# Patient Record
(demographics unavailable — no encounter records)

---

## 2025-04-10 NOTE — HISTORY OF PRESENT ILLNESS
[de-identified] : weight check [FreeTextEntry6] : Here today for weight check.  - Breast feeding q2-3hrs, mom feels milk is in now. 15-75 min per session but no introducing pacifier at end of longer feeds as thinks looking for comfort. Minimal spitting up. -Stooling well, 6x/day seedy yellow. At least 2 UOP so far today, parents state hard to tell due to frequent stools.   - Good sleeping patterns. -  No parental concerns.

## 2025-04-17 NOTE — HISTORY OF PRESENT ILLNESS
[de-identified] : Weight recheck [FreeTextEntry6] : BF ad carlota > 6 wet diapers/day yellow seedy stools comfortable after feeds

## 2025-04-17 NOTE — DISCUSSION/SUMMARY
[FreeTextEntry1] : 12 day F seen for follow up. Good interval weight gain. Feeding, voiding, stooling well. No jaundice. Normal exam. Very long discussion with parents re: [facility] vaccination requirements and our CDC schedule. Plan will be to give one vaccine per visit as scheduled- 2mo Vaxselis #1 3mo PCV 20 #1 4mo Vaxselis #2 5mo PCV 20 #2 6mo Vaxselis #3 7mo PCV 20 #3 12mo MMR #1 13mo PCV 20 #4 15mo Varicella #1 16mo HIB #4 18mo DTaP #4 Rotavirus series will not be given. Hep A series will be done after the required vaccinations are done. Parents aware aware all of these visits are to be schedule on my schedule only. They verbalized agreement with the plan put forth and verbalized understanding of the information provided. Answered all questions. RTO at 1mo for WCC, sooner if ANY concerns arise.

## 2025-04-21 NOTE — PHYSICAL EXAM
[General Appearance - Alert] : alert [General Appearance - In No Acute Distress] : in no acute distress [General Appearance - Well-Appearing] : well appearing [Appearance Of Head] : the head was normocephalic [Facies] : there were no dysmorphic facial features [Examination Of The Oral Cavity] : mucous membranes were moist and pink [Auscultation Breath Sounds / Voice Sounds] : breath sounds clear to auscultation bilaterally [Normal Chest Appearance] : the chest was normal in appearance [Apical Impulse] : quiet precordium with normal apical impulse [Heart Rate And Rhythm] : normal heart rate and rhythm [Heart Sounds] : normal S1 and S2 [Heart Sounds Gallop] : no gallops [Heart Sounds Pericardial Friction Rub] : no pericardial rub [Edema] : no edema [Arterial Pulses] : normal upper and lower extremity pulses with no pulse delay [Heart Sounds Click] : no clicks [Capillary Refill Test] : normal capillary refill [Systolic] : systolic [II] : a grade 2/6 [LMSB] : LMSB  [Holosystolic] : holosystolic [No Diastolic Murmur] : no diastolic murmur was heard [Bowel Sounds] : normal bowel sounds [Abdomen Soft] : soft [Nondistended] : nondistended [Abdomen Tenderness] : non-tender [Nail Clubbing] : no clubbing  or cyanosis of the fingernails [Motor Tone] : normal muscle strength and tone [] : no rash [Skin Lesions] : no lesions [Skin Turgor] : normal turgor

## 2025-04-21 NOTE — CONSULT LETTER
[Today's Date] : [unfilled] [Name] : Name: [unfilled] [] : : ~~ [Today's Date:] : [unfilled] [Dear  ___:] : Dear Dr. [unfilled]: [Consult] : I had the pleasure of evaluating your patient, [unfilled]. My full evaluation follows. [Consult - Single Provider] : Thank you very much for allowing me to participate in the care of this patient. If you have any questions, please do not hesitate to contact me. [Sincerely,] : Sincerely, [FreeTextEntry4] : Mariana Cook NP, RN [FreeTextEntry5] : [facility] [place] [FreeTextEntry6] : [address], [place] [id number], [place] [phone] [FreeTextEntry7] : [phone] [de-identified] : Marnie Llamas MD, FAAP, FACC  Attending Physician, Division of Pediatric Cardiology  [facility]   , Department of Pediatrics  [facility]

## 2025-04-21 NOTE — CARDIOLOGY SUMMARY
[Today's Date] : [unfilled] [FreeTextEntry1] : 15 lead EKG was performed which demonstrated sinus rhythm at a rate of 157 bpm.  All axes and intervals were within normal limits for age. There was no evidence of ventricular hypertrophy and there were no significant ST segment changes.  [FreeTextEntry2] : 1. Situs solitus, D-ventricular looping, normally related great arteries. 2. Patent foramen ovale with left to right shunt, normal variant. 3. Normal Doppler profile in main pulmonary artery, acceleration of right pulmonary artery flow velocity < 2m/s, consistent with physiologic peripheral pulmonary stenosis and acceleration of left pulmonary artery flow velocity < 2m/s, consistent with physiologic pulmonary stenosis. 4. Mid-muscular ventricular septal defect, small, with left to right systolic interventricular shunt. 5. Normal left ventricular size, morphology and systolic function. 6. Normal right ventricular morphology with qualitatively normal size and systolic function. 7. No patent ductus arteriosus. 8. No pericardial effusion.

## 2025-04-21 NOTE — CLINICAL NARRATIVE
[FreeTextEntry2] : Vaccines not up to date.  Discharge weight on 4/7/25: 2776g Todays weight: 2940g  Patient is gaining 11.7g/day since discharge on 4/7/25.

## 2025-05-08 NOTE — PHYSICAL EXAM
[Alert] : alert [Acute Distress] : no acute distress [Normocephalic] : normocephalic [Flat Open Anterior Fillmore] : flat open anterior fontanelle [PERRL] : PERRL [Red Reflex Bilateral] : red reflex bilateral [Normally Placed Ears] : normally placed ears [Auricles Well Formed] : auricles well formed [Clear Tympanic membranes] : clear tympanic membranes [Light reflex present] : light reflex present [Bony landmarks visible] : bony landmarks visible [Discharge] : no discharge [Nares Patent] : nares patent [Palate Intact] : palate intact [Uvula Midline] : uvula midline [Supple, full passive range of motion] : supple, full passive range of motion [Palpable Masses] : no palpable masses [Symmetric Chest Rise] : symmetric chest rise [Clear to Auscultation Bilaterally] : clear to auscultation bilaterally [Regular Rate and Rhythm] : regular rate and rhythm [S1, S2 present] : S1, S2 present [Murmurs] : no murmurs [+2 Femoral Pulses] : +2 femoral pulses [Soft] : soft [Tender] : nontender [Distended] : not distended [Bowel Sounds] : bowel sounds present [Hepatomegaly] : no hepatomegaly [Splenomegaly] : no splenomegaly [Normal external genitailia] : normal external genitalia [Clitoromegaly] : no clitoromegaly [Patent Vagina] : vagina patent [Normally Placed] : normally placed [No Abnormal Lymph Nodes Palpated] : no abnormal lymph nodes palpated [Lee-Ortolani] : negative Lee-Ortolani [Symmetric Flexed Extremities] : symmetric flexed extremities [Spinal Dimple] : no spinal dimple [Tuft of Hair] : no tuft of hair [Startle Reflex] : startle reflex present [Suck Reflex] : suck reflex present [Rooting] : rooting reflex present [Palmar Grasp] : palmar grasp reflex present [Plantar Grasp] : plantar grasp reflex present [Symmetric Roque] : symmetric Eden [Jaundice] : no jaundice [Rash and/or lesion present] : no rash/lesion [FreeTextEntry2] : very small but open anterior fontanelle; head appears grossly normocephalic except for asymmetry in the b/l frontal regions- left appears more rounded, right frontal area appears flatter [FreeTextEntry9] : no masses

## 2025-05-08 NOTE — HISTORY OF PRESENT ILLNESS
[Breast milk] : breast milk [Normal] : Normal [In Bassinet/Crib] : sleeps in bassinet/crib [On back] : sleeps on back [Co-sleeping] : no co-sleeping [Loose bedding, pillow, toys, and/or bumpers in crib] : no loose bedding, pillow, toys, and/or bumpers in crib [No] : No cigarette smoke exposure [Exposure to electronic nicotine delivery system] : No exposure to electronic nicotine delivery system [Rear facing car seat in back seat] : Rear facing car seat in back seat [Carbon Monoxide Detectors] : Carbon monoxide detectors at home [Smoke Detectors] : Smoke detectors at home. [FreeTextEntry7] : parents had sent me a picture of the intermittent round bulge they see in baby's abdomen; baby is feeding, voiding, stooling well. Some spit up, no large volume, no projectile. Parents are also concerned re: head shape- left frontal area of head seems more pronounced; prefers to face rightward; saw cardiology- PFO, PPS, VSD- asymptomatic and thriving

## 2025-05-08 NOTE — DISCUSSION/SUMMARY
[Normal Growth] : growth [Normal Development] : development  [No Elimination Concerns] : elimination [Continue Regimen] : feeding [No Skin Concerns] : skin [Normal Sleep Pattern] : sleep [None] : no medical problems [Anticipatory Guidance Given] : Anticipatory guidance addressed as per the history of present illness section [Parental Well-Being] : parental well-being [Family Adjustment] : family adjustment [Feeding Routines] : feeding routines [Infant Adjustment] : infant adjustment [Safety] : safety [No Medications] : ~He/She~ is not on any medications [Parent/Guardian] : Parent/Guardian [FreeTextEntry1] : 1mo F seen for WCC. Normal exam aside from mild asymmetry of front of skull and small AF. Appropriate interval growth. US of abdomen- limited, to assess the intermittent round protrusion (midline abdominal wall). Neurosurgery referral re: head shape, small AF. Parents will start vaccines at next month with modified vaccine schedule that we agreed upon. All vaccine visits are to be scheduled on my schedule- parents are aware. RTO 1 mo for 2mo WCC.

## 2025-05-23 NOTE — REASON FOR VISIT
[Initial - Scheduled] : an initial, scheduled visit with concerns of [Soft tissue mass] : soft tissue mass [Parents] : parents [Family Member] : family member [Mother] : mother

## 2025-05-28 NOTE — PHYSICAL EXAM
[Acute Distress] : no acute distress [Alert] : alert [Toxic appearing] : well appearing [Normocephalic] : normocephalic [Icteric sclera] : no icteric sclera [FROM] : full range of motion [Normal Respiratory Effort] : normal respiratory efforts [Tender] : not tender [Distended] : not distended [Moves all extremities x4] : moves all extremities x4 [Grossly intact] : grossly intact [TextBox_37] : No mass palpable in the region of concern.  There is no evidence of epigastric hernia and no umbilical hernia

## 2025-05-28 NOTE — CONSULT LETTER
[Dear  ___] : Dear  [unfilled], [Consult Letter:] : I had the pleasure of evaluating your patient, [unfilled]. [Please see my note below.] : Please see my note below. [Consult Closing:] : Thank you very much for allowing me to participate in the care of this patient.  If you have any questions, please do not hesitate to contact me. [Sincerely,] : Sincerely, [FreeTextEntry3] : Echo De La Rosa MD Division of Pediatric, General, Thoracic and Endoscopic Surgery [facility]

## 2025-05-28 NOTE — CONSULT LETTER
[Dear  ___] : Dear  [unfilled], [Consult Letter:] : I had the pleasure of evaluating your patient, [unfilled]. [Please see my note below.] : Please see my note below. [Consult Closing:] : Thank you very much for allowing me to participate in the care of this patient.  If you have any questions, please do not hesitate to contact me. [Sincerely,] : Sincerely, [FreeTextEntry3] : Echo De La Rosa MD Division of Pediatric, General, Thoracic and Endoscopic Surgery [facility]

## 2025-05-28 NOTE — HISTORY OF PRESENT ILLNESS
[FreeTextEntry1] : Eun is a 47day old female presenting today for surgical evaluation of an abdominal mass.  Mother noticed a bump above the umbilicus around 1 week of life.  It seemed to be intermittent.  There was no associated redness or apparent discomfort.  Mother has a picture which is concerning in appearance for an epigastric hernia.  Eun has been eating well and having normal bowel function.  Mother has not noticed the bump for the last several weeks.  An US of the area demonstrated a 2 x 1 x3 mm echogenic structure in the anterior abdominal wall in the supraumbilical region.   PMHx:  Full-term; no additional post-sally hospital course PHSx:  None Meds:  None NKDA Family:  No h/o anesthesia related complications or bleeding d/o

## 2025-05-28 NOTE — ASSESSMENT
[FreeTextEntry1] : Eun presents for evaluation of a possible mass of the abdominal wall.  Mother first noticed the lesion shortly after birth, but has not seen it in the last few weeks.  She has a picture of the lesion which is concerning in appearance for an epigastric hernia, however, no evidence of a hernia is appreciated on exam today.  No mass is able to be palpated.  The US results were reviewed, however are non-specific.  We will plan for continued observation as there is no clear indication for surgical intervention at this time.  Mother was also counseled on the nature of epigastric hernias as well as the signs and symptoms of hernia incarceration.  She understands the need to seek immediate medical attention should there be concern for hernia incarceration.  Mother has otherwise been instructed to return to the office should she notice the lesion again, or for any other concerns.  We will otherwise plan for surgical follow-up on an as needed basis.

## 2025-05-28 NOTE — CONSULT LETTER
[Dear  ___] : Dear  [unfilled], [Consult Letter:] : I had the pleasure of evaluating your patient, [unfilled]. [Please see my note below.] : Please see my note below. [Consult Closing:] : Thank you very much for allowing me to participate in the care of this patient.  If you have any questions, please do not hesitate to contact me. [Sincerely,] : Sincerely, [FreeTextEntry3] : Echo De La Rosa MD Division of Pediatric, General, Thoracic and Endoscopic Surgery [facility]

## 2025-07-05 NOTE — HISTORY OF PRESENT ILLNESS
[de-identified] : Surgery follow up from 07/01. 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 with Dr. Garcia. Procedure was endoscopic craniectomy.  Tolerated procedure well with no complications, minimal blood loss. DC'd home 7/2. Comfortable at home. BF ad carlota. Feeding, voiding, stooling well. Has neurosurgery follow up next week.

## 2025-07-05 NOTE — DISCUSSION/SUMMARY
[FreeTextEntry1] : 3mo F seen for hospital discharge follow up. Doing well post-operatively. Recovering nicely, no complications to date. Normal exam today. Neurosurgery f/u in place. F/U PRN and for routine WCC.

## 2025-07-05 NOTE — PHYSICAL EXAM
[NL] : warm, clear [FreeTextEntry2] : right side of head- surgical site CDI, covered with steris, wound is approximated, small fluid collection medial to the incision.  [FreeTextEntry9] : no masses